# Patient Record
Sex: FEMALE | Race: WHITE | Employment: STUDENT | URBAN - METROPOLITAN AREA
[De-identification: names, ages, dates, MRNs, and addresses within clinical notes are randomized per-mention and may not be internally consistent; named-entity substitution may affect disease eponyms.]

---
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
## 2024-02-12 ENCOUNTER — TELEPHONE (OUTPATIENT)
Dept: PEDIATRICS | Facility: CLINIC | Age: 19
End: 2024-02-12
Payer: COMMERCIAL

## 2024-02-12 NOTE — TELEPHONE ENCOUNTER
JESSY IS AT [facility], [employer] SHE IS STRUGGLING WITH DEPRESSION. SHE WOULD LIKE TO TALK TO YOU, ABOUT WHAT SHE CAN DO.

## 2024-03-25 RX ORDER — DIAZEPAM 2 MG/1
TABLET ORAL
COMMUNITY
Start: 2023-12-01 | End: 2024-05-07 | Stop reason: ALTCHOICE

## 2024-03-25 RX ORDER — SCOLOPAMINE TRANSDERMAL SYSTEM 1 MG/1
PATCH, EXTENDED RELEASE TRANSDERMAL
COMMUNITY
Start: 2023-12-01 | End: 2024-05-07 | Stop reason: ALTCHOICE

## 2024-03-25 RX ORDER — CLINDAMYCIN HYDROCHLORIDE 300 MG/1
CAPSULE ORAL
COMMUNITY
Start: 2023-04-16 | End: 2024-05-07 | Stop reason: ALTCHOICE

## 2024-03-25 RX ORDER — METHOCARBAMOL 500 MG/1
TABLET, FILM COATED ORAL
COMMUNITY
Start: 2023-12-01 | End: 2024-05-07 | Stop reason: ALTCHOICE

## 2024-03-25 RX ORDER — OXYCODONE HYDROCHLORIDE 5 MG/1
TABLET ORAL
COMMUNITY
Start: 2023-12-01 | End: 2024-05-07 | Stop reason: ALTCHOICE

## 2024-03-25 RX ORDER — DAPSONE 75 MG/G
GEL TOPICAL
COMMUNITY
Start: 2024-02-06

## 2024-05-07 ENCOUNTER — OFFICE VISIT (OUTPATIENT)
Dept: PEDIATRICS | Facility: CLINIC | Age: 19
End: 2024-05-07
Payer: COMMERCIAL

## 2024-05-07 VITALS
RESPIRATION RATE: 24 BRPM | TEMPERATURE: 97.2 F | BODY MASS INDEX: 22.24 KG/M2 | WEIGHT: 138.4 LBS | SYSTOLIC BLOOD PRESSURE: 112 MMHG | OXYGEN SATURATION: 98 % | HEART RATE: 78 BPM | HEIGHT: 66 IN | DIASTOLIC BLOOD PRESSURE: 64 MMHG

## 2024-05-07 DIAGNOSIS — Z00.00 WELL ADULT EXAM: Primary | ICD-10-CM

## 2024-05-07 PROBLEM — F41.1 GAD (GENERALIZED ANXIETY DISORDER): Status: ACTIVE | Noted: 2024-02-14

## 2024-05-07 PROBLEM — M94.0 SLIPPED RIB SYNDROME: Status: ACTIVE | Noted: 2023-10-25

## 2024-05-07 PROBLEM — Q67.6 PECTUS EXCAVATUM: Status: ACTIVE | Noted: 2023-11-29

## 2024-05-07 PROBLEM — H57.02 ANISOCORIA: Status: RESOLVED | Noted: 2023-08-15 | Resolved: 2024-05-07

## 2024-05-07 PROCEDURE — 99395 PREV VISIT EST AGE 18-39: CPT | Performed by: PEDIATRICS

## 2024-05-07 PROCEDURE — 96127 BRIEF EMOTIONAL/BEHAV ASSMT: CPT | Performed by: PEDIATRICS

## 2024-05-07 PROCEDURE — 1036F TOBACCO NON-USER: CPT | Performed by: PEDIATRICS

## 2024-05-07 RX ORDER — ESCITALOPRAM OXALATE 20 MG/1
20 TABLET ORAL DAILY
COMMUNITY

## 2024-05-07 NOTE — PROGRESS NOTES
"The patient is here today for routine health maintenance with self    Concerns: none  - rib pain gone after surgery  - feeling sick but \"in a weird way\", every day for last couple months at some point will feel sick, will get upset stomach & feel like can't eat, will feel light headed but no LOC, no cp or palpitations, better after 1h, often in morning 30min after wakes or in evening, tries to eat & drink something, not triggered by exercise  - stooling daily  - had bronchitis 2wks ago, tx'd w/inhaler & cough medicine, no abx, did get better  - on daily lexapro 20mg, takes around 8pm, started 2/2024 by different doctor for feeling sad, believe helping  - uses allergy meds prn, last 2wks ago  - sweating unchanged  - on topicals for acne but considering restarting doxy from derm  - 2/2024 nl TSH  - interested in test for ADHD & autism, saw counselor at [facility] who discussed w/pt, has texture issues, trying to force self to make eye contact, feels safer w/things in ears, hyperfixations, can have spurts where feels jittery & will grab onto something    Nutrition: pretzels, a lot less picky, good variety, ok dairy    Dental care:   Child has a dental home: Yes   Dental hygiene is regularly performed: Yes    Sleep: some issues falling asleep, to bed around 11p, up around 9-10am, feels rested  Sleep patterns are appropriate: Yes    Developmental/Education: [place], did well, criminal justice major  Receives educational accommodations: No  Social interaction is age appropriate: Yes  School behaviors are within normal limits: Yes    Menstrual History: will start then stop completely for about 24h then finally starts & has for 4d, menses qmo, cramps D#1    Activities: walking    Sports Participation Screening:   History of a concussion: No  Fainting or near fainting during or after exercise: No  Chest pain during exercise: No  Shortness of breath during exercise: No  Palpitations, rapid or skipped heart beats at rest or during " exercise: No  Known heart problems: No  Any family member have a heart attack or die without cause prior to 50 years old? No    Sex:   Engaging in sexual intercourse (vaginal, anal, oral): No    Drugs (Substance use/abuse):   Drug use: No  Tobacco use: No  Alcohol use: No    Mental Health:    Screening questionnaire for depression: Failed  Having thoughts of hurting self or has considered suicide: No    Safety:   Uses safety belts or equipment: Yes  Uses a helmet: Yes    Immunization History   Administered Date(s) Administered    DTaP vaccine, pediatric  (INFANRIX) 11/18/2009    DTaP vaccine, pediatric (DAPTACEL) 2004, 03/04/2005, 05/06/2005, 02/03/2006    Flu vaccine (IIV4), preservative free *Check age/dose* 08/30/2016, 09/05/2017, 09/06/2018, 10/28/2020, 12/07/2021    Flu vaccine, quadrivalent, no egg protein, age 6 month or greater (FLUCELVAX) 11/11/2019    HPV, Unspecified 09/05/2017, 09/06/2018    Hepatitis A vaccine, pediatric/adolescent (HAVRIX, VAQTA) 11/18/2009, 10/21/2010    Hepatitis B vaccine, pediatric/adolescent (RECOMBIVAX, ENGERIX) 2004, 05/06/2005, 08/05/2005    HiB PRP-OMP conjugate vaccine, pediatric (PEDVAXHIB) 2004, 03/04/2005, 05/06/2005, 10/31/2005    Influenza, seasonal, injectable 10/31/2007, 11/02/2013, 10/23/2014    Influenza, seasonal, injectable, preservative free 11/04/2008, 10/21/2010, 10/23/2012, 11/06/2015, 08/30/2016    MMR vaccine, subcutaneous (MMR II) 10/31/2005, 11/04/2008    Meningococcal ACWY vaccine (MENVEO) 10/28/2020    Meningococcal B, Unspecified 10/28/2020, 12/07/2021    Meningococcal MCV4P 08/30/2016    Novel influenza-H1N1-09, preservative-free 11/25/2009, 01/07/2010    PPD Test 11/04/2008    Pfizer Purple Cap SARS-CoV-2 06/17/2021, 07/08/2021    Pneumococcal Conjugate PCV 7 2004, 03/04/2005, 05/06/2005, 02/03/2006    Poliovirus vaccine, subcutaneous (IPOL) 2004, 03/04/2005, 08/05/2005, 11/18/2009    Tdap vaccine, age 7 year and older  "(BOOSTRIX, ADACEL) 08/30/2016    Varicella vaccine, subcutaneous (VARIVAX) 02/03/2006, 11/18/2009     Objective   /64   Pulse 78   Temp 36.2 °C (97.2 °F)   Resp 24   Ht 1.664 m (5' 5.5\")   Wt 62.8 kg (138 lb 6.4 oz)   LMP 04/22/2024   SpO2 98%   BMI 22.68 kg/m²   Wt Readings from Last 2 Encounters:   05/07/24 62.8 kg (138 lb 6.4 oz) (68%, Z= 0.46)*   08/15/23 59.9 kg (132 lb) (61%, Z= 0.28)*     * Growth percentiles are based on CDC (Girls, 2-20 Years) data.     Ht Readings from Last 2 Encounters:   05/07/24 1.664 m (5' 5.5\") (68%, Z= 0.47)*   12/13/22 1.664 m (5' 5.5\") (69%, Z= 0.50)*     * Growth percentiles are based on CDC (Girls, 2-20 Years) data.     Physical Exam  Pt declined chaperone during exam  Well-appearing  HEENT: AT/NC, TMs nl, PERRL, no conjunctival injection or eye discharge, no nasal congestion, MMM, throat nl  NECK: no cervical LAD, no thyromegaly/thyroid nodules  CV: RRR, no murmur  LUNGS: no G/F/R, good AE bilaterally, CTA bilaterally  BREASTS: no masses or discharge, Alex V  GI: +BS, soft, NT/ND, no HSM  R lower lateral chest & abd w/well healed surgical scars  : nl female, Alex V  no scoliosis, gait nl, no c/c/e of extremities  SKIN: no rashes  nl tone    Assessment/Plan   17yo female, WCC  1. shots UTD  2. allergic rhinitis - cont zyrtec prn  3. h/o sig mood swings & some physical aggression toward parents only - resolved s/p counseling  4. h/o sleep difficulties - sig improved, restart melatonin prn  5. f/u 1y for [facility]  6. h/o LLQ abd pain 2010 s/p neg uncontrasted CT in ER, U/S nl except unable to visualize L ovary but pt young at time; 5/2021 sudden onset severe abd pain w/nl RUQ U/S & labs - other episodes of abd pain c/w constipation but now resolved, will consider reimaging in future if any concerns  7. h/o HAs/carsickness - cont bonine prn   8. hyperhidrosis - sig improved, not needing meds now, stopped topical wipes after caused anisocoria, s/p neg ophtho eval  9. " "acne - cont meds & f/u derm as directed   10. 11/2023 s/p resection cartilaginous portions of slipping ribs 9 & 10  11. +depression screen, anxiety - cont lexapro & F/u specialist for management of meds as directed  12. Daily \"weird\" episodes, suspect vasovagal - 2/2024 nl CBC & TSH & CMP, start MVI w/Fe & vit D 2000iu daily, increase water intake to at least 80oz per day, eat regularly, to call if cont sx for further lab eval  13. Pt questioning if ADHD vs autism vs other - see Dr [facility] group or similar for eval  "

## 2024-12-19 ENCOUNTER — OFFICE VISIT (OUTPATIENT)
Dept: URGENT CARE | Age: 19
End: 2024-12-19
Payer: COMMERCIAL

## 2024-12-19 VITALS
HEART RATE: 100 BPM | WEIGHT: 138 LBS | DIASTOLIC BLOOD PRESSURE: 72 MMHG | TEMPERATURE: 98.3 F | RESPIRATION RATE: 16 BRPM | OXYGEN SATURATION: 98 % | BODY MASS INDEX: 22.62 KG/M2 | SYSTOLIC BLOOD PRESSURE: 109 MMHG

## 2024-12-19 DIAGNOSIS — J06.9 UPPER RESPIRATORY TRACT INFECTION, UNSPECIFIED TYPE: Primary | ICD-10-CM

## 2024-12-19 LAB
POC RAPID INFLUENZA A: NEGATIVE
POC RAPID INFLUENZA B: NEGATIVE
POC RAPID STREP: NEGATIVE
POC SARS-COV-2 AG BINAX: NORMAL

## 2024-12-19 RX ORDER — METHYLPREDNISOLONE 4 MG/1
TABLET ORAL
Qty: 21 TABLET | Refills: 0 | Status: SHIPPED | OUTPATIENT
Start: 2024-12-19

## 2024-12-19 RX ORDER — AZITHROMYCIN 250 MG/1
TABLET, FILM COATED ORAL
Qty: 6 TABLET | Refills: 0 | Status: SHIPPED | OUTPATIENT
Start: 2024-12-19

## 2024-12-19 RX ORDER — BROMPHENIRAMINE MALEATE, PSEUDOEPHEDRINE HYDROCHLORIDE, AND DEXTROMETHORPHAN HYDROBROMIDE 2; 30; 10 MG/5ML; MG/5ML; MG/5ML
5 SYRUP ORAL EVERY 4 HOURS PRN
Qty: 120 ML | Refills: 0 | Status: SHIPPED | OUTPATIENT
Start: 2024-12-19

## 2024-12-19 NOTE — PROGRESS NOTES
Subjective   Patient ID: Mayra Gerardo is a 19 y.o. female who presents for Cough (Wet cough 1 week. Fever in the beginning, and headache and congestion. Swollen throat and inflamation. ).  HPI  Presents for evaluation of URI.  Symptoms including cough, congestion, body aches, malaise, and headache have been present for several days and refractory to OTC meds.  No fever, chills, nausea, vomiting, abdominal pain, CP, or SOB.  No exacerbating factors    Review of Systems    Constitutional:  See HPI   ENT: See HPI  Respiratory: See HPI  Neurologic:  Alert and oriented X4, No numbness, No tingling.    All other systems are negative     Objective     /72   Pulse 100   Temp 36.8 °C (98.3 °F)   Resp 16   Wt 62.6 kg (138 lb)   SpO2 98%   BMI 22.62 kg/m²     Physical Exam    General:  Alert and oriented, No acute distress.    Eye:  Pupils are equal, round and reactive to light, Normal conjunctiva.    HENT:  Normocephalic, unremarkable oropharynx; nontender cervical lymph nodes; nontender sinuses  Neck:  Supple    Respiratory: Respirations are non-labored; LCTA bilaterally  Musculoskeletal: Normal ROM and strength  Integumentary:  Warm, Dry, Intact, No pallor, No rash.    Neurologic:  Alert, Oriented, Normal sensory, Cranial Nerves II-XII are grossly intact  Psychiatric:  Cooperative, Appropriate mood & affect.    Assessment/Plan   Exam is consistent with upper respiratory infection.  Prescriptions for Z-Tino, Medrol, and Bromfed.  Patient's clinical presentation is otherwise unremarkable at this time. Patient is discharged with instructions to follow-up with primary care or seek emergency medical attention for worsening symptoms or any new concerns.  Problem List Items Addressed This Visit    None  Visit Diagnoses       Upper respiratory tract infection, unspecified type    -  Primary    Relevant Medications    azithromycin (Zithromax) 250 mg tablet    brompheniramine-pseudoeph-DM (Bromfed DM) 2-30-10 mg/5 mL  syrup    methylPREDNISolone (Medrol Dospak) 4 mg tablets    Other Relevant Orders    POCT rapid strep A manually resulted    POCT Covid-19 Rapid Antigen    POCT Influenza A/B manually resulted            Final diagnoses:   [J06.9] Upper respiratory tract infection, unspecified type

## 2024-12-26 ENCOUNTER — TELEPHONE (OUTPATIENT)
Dept: PEDIATRICS | Facility: CLINIC | Age: 19
End: 2024-12-26
Payer: COMMERCIAL

## 2024-12-26 NOTE — TELEPHONE ENCOUNTER
Please advise     So she's losing her hair and been having stomach aches. I offered her apot with [facility] cause DR Hancock has no opening the week of he 6th, when she's home from school.  Mom would like Dr Hancock know what's happening and would like blood work order.